# Patient Record
Sex: MALE | Race: WHITE | NOT HISPANIC OR LATINO | URBAN - METROPOLITAN AREA
[De-identification: names, ages, dates, MRNs, and addresses within clinical notes are randomized per-mention and may not be internally consistent; named-entity substitution may affect disease eponyms.]

---

## 2023-04-03 ENCOUNTER — LAB OUTSIDE AN ENCOUNTER (OUTPATIENT)
Dept: URBAN - METROPOLITAN AREA CLINIC 23 | Facility: CLINIC | Age: 28
End: 2023-04-03

## 2023-04-03 ENCOUNTER — OFFICE VISIT (OUTPATIENT)
Dept: URBAN - METROPOLITAN AREA CLINIC 23 | Facility: CLINIC | Age: 28
End: 2023-04-03
Payer: COMMERCIAL

## 2023-04-03 ENCOUNTER — WEB ENCOUNTER (OUTPATIENT)
Dept: URBAN - METROPOLITAN AREA CLINIC 23 | Facility: CLINIC | Age: 28
End: 2023-04-03

## 2023-04-03 VITALS
SYSTOLIC BLOOD PRESSURE: 121 MMHG | HEIGHT: 72 IN | TEMPERATURE: 97.2 F | WEIGHT: 179 LBS | HEART RATE: 50 BPM | DIASTOLIC BLOOD PRESSURE: 61 MMHG | BODY MASS INDEX: 24.24 KG/M2

## 2023-04-03 DIAGNOSIS — E13.9 DIABETES MELLITUS OF OTHER TYPE WITHOUT COMPLICATION, UNSPECIFIED WHETHER LONG TERM INSULIN USE: ICD-10-CM

## 2023-04-03 DIAGNOSIS — R74.8 ELEVATED LIVER ENZYMES: ICD-10-CM

## 2023-04-03 DIAGNOSIS — D69.6 THROMBOCYTOPENIA: ICD-10-CM

## 2023-04-03 PROCEDURE — 99204 OFFICE O/P NEW MOD 45 MIN: CPT | Performed by: INTERNAL MEDICINE

## 2023-04-03 NOTE — HPI-TODAY'S VISIT:
27-year-old male referred by endocrinologist for liver liver enzymes.  On review it has been chronic. Denies abdominal pain.  No excessive alcohol use.  No family history of liver disease. He takes some supplements creatinine and protein for work-up.  Otherwise none.

## 2023-04-03 NOTE — PREVIOUS WORKUP REVIEWED
. ENDOSCOPIES  LABS -Labs 3/2/2023: Glucose 126, BUN 24, creatinine 0.91, sodium 141, potassium 4.1, total protein 6.7, albumin 4.9, total bilirubin 1.3 H, alkaline phosphatase 58, AST 42, ALT 61.  Cholesterol 118, triglycerides 72, LDL 66. -Labs 1/24/2023: Total bilirubin 1.0, alkaline phosphatase 52, AST 47, ALT 58. -Labs 8/10/2021: Total bilirubin 1.3, alkaline phosphatase 58, AST 52, ALT 62, WBC 4.5, hemoglobin 15.5, platelet 134.  IMAGES

## 2023-04-17 LAB
ACTIN (SMOOTH MUSCLE) ANTIBODY (IGG): <20
ALPHA-1-ANTITRYPSIN (AAT) PHENOTYPE: (no result)
ALPHA-1-ANTITRYPSIN QN: 110
ANA SCREEN, IFA: NEGATIVE
ANCA SCREEN: NEGATIVE
CERULOPLASMIN: 20
HEPATITIS B CORE AB TOTAL: (no result)
HEPATITIS B SURFACE AB IMMUNITY, QN: 982
HEPATITIS B SURFACE ANTIGEN: (no result)
HEPATITIS C ANTIBODY: (no result)
IMMUNOGLOBULIN G, QN, SERUM: 875
INDEX: 0.11
IRON BIND.CAP.(TIBC): 275
IRON SATURATION: 43
IRON: 118
MITOCHONDRIAL AB SCREEN: NEGATIVE

## 2023-04-26 PROBLEM — 302215000: Status: ACTIVE | Noted: 2023-04-26

## 2023-04-27 PROBLEM — 111552007: Status: ACTIVE | Noted: 2023-04-27

## 2023-04-28 ENCOUNTER — OFFICE VISIT (OUTPATIENT)
Dept: URBAN - METROPOLITAN AREA CLINIC 22 | Facility: CLINIC | Age: 28
End: 2023-04-28
Payer: COMMERCIAL

## 2023-04-28 DIAGNOSIS — R74.8 ELEVATED LIVER ENZYMES: ICD-10-CM

## 2023-04-28 PROCEDURE — 76700 US EXAM ABDOM COMPLETE: CPT | Performed by: INTERNAL MEDICINE

## 2023-05-16 ENCOUNTER — TELEPHONE ENCOUNTER (OUTPATIENT)
Dept: URBAN - METROPOLITAN AREA CLINIC 113 | Facility: CLINIC | Age: 28
End: 2023-05-16

## 2023-06-23 ENCOUNTER — OFFICE VISIT (OUTPATIENT)
Dept: URBAN - METROPOLITAN AREA CLINIC 23 | Facility: CLINIC | Age: 28
End: 2023-06-23
Payer: COMMERCIAL

## 2023-06-23 VITALS
HEIGHT: 72 IN | SYSTOLIC BLOOD PRESSURE: 114 MMHG | DIASTOLIC BLOOD PRESSURE: 72 MMHG | TEMPERATURE: 97.8 F | BODY MASS INDEX: 23.73 KG/M2 | WEIGHT: 175.2 LBS | HEART RATE: 58 BPM

## 2023-06-23 DIAGNOSIS — K75.81 NASH (NONALCOHOLIC STEATOHEPATITIS): ICD-10-CM

## 2023-06-23 PROCEDURE — 99214 OFFICE O/P EST MOD 30 MIN: CPT | Performed by: INTERNAL MEDICINE

## 2023-06-23 NOTE — PREVIOUS WORKUP REVIEWED
- , .ENDOSCOPIESLABS-Labs 4/3/2023: ANCA negative, iron saturation 43%, ceruloplasmin 20, IgG 875, alpha-1 antitrypsin 110 MM, hep B surface antibody positive, anti-smooth muscle antibody negative, MURRAY negative, hep B core antibody total negative, Hep B sAg negative, hep C antibody negative, antimitochondrial antibody negative.-Labs 3/2/2023: Glucose 126, BUN 24, creatinine 0.91, sodium 141, potassium 4.1, total protein 6.7, albumin 4.9, total bilirubin 1.3 H, alkaline phosphatase 58, AST 42, ALT 61.  Cholesterol 118, triglycerides 72, LDL 66.-Labs 1/24/2023: Total bilirubin 1.0, alkaline phosphatase 52, AST 47, ALT 58.-Labs 8/10/2021: Total bilirubin 1.3, alkaline phosphatase 58, AST 52, ALT 62, WBC 4.5, hemoglobin 15.5, platelet 134.IMAGES-Abdominal ultrasound 4/28/2023: Normal liver.  Normal gallbladder.  Normal bile ducts, CBD 3 mm.  Spleen 11.3 cm.

## 2023-06-23 NOTE — HPI-TODAY'S VISIT:
27-year-old male presents for follow-up of elevated liver enzymes.  Work-ups revealed unremarkable, most likely Morales. His hemoglobin A1c is 7 per patient.  He is getting insulin shots.  He is not sure whether he can do weight loss further from the current weight. Brother has Gilbert syndrome.

## 2023-07-13 ENCOUNTER — DASHBOARD ENCOUNTERS (OUTPATIENT)
Age: 28
End: 2023-07-13

## 2023-07-13 PROBLEM — 442685003: Status: ACTIVE | Noted: 2023-07-13
